# Patient Record
Sex: MALE | Race: BLACK OR AFRICAN AMERICAN | NOT HISPANIC OR LATINO | Employment: FULL TIME | URBAN - METROPOLITAN AREA
[De-identification: names, ages, dates, MRNs, and addresses within clinical notes are randomized per-mention and may not be internally consistent; named-entity substitution may affect disease eponyms.]

---

## 2024-01-09 ENCOUNTER — HOSPITAL ENCOUNTER (EMERGENCY)
Facility: HOSPITAL | Age: 54
Discharge: HOME | End: 2024-01-09
Attending: EMERGENCY MEDICINE
Payer: COMMERCIAL

## 2024-01-09 VITALS
SYSTOLIC BLOOD PRESSURE: 120 MMHG | HEART RATE: 72 BPM | DIASTOLIC BLOOD PRESSURE: 76 MMHG | OXYGEN SATURATION: 93 % | TEMPERATURE: 98.4 F | RESPIRATION RATE: 18 BRPM

## 2024-01-09 DIAGNOSIS — S39.012A LUMBOSACRAL STRAIN, INITIAL ENCOUNTER: ICD-10-CM

## 2024-01-09 DIAGNOSIS — M54.17 LUMBOSACRAL RADICULOPATHY: Primary | ICD-10-CM

## 2024-01-09 PROCEDURE — 2500000004 HC RX 250 GENERAL PHARMACY W/ HCPCS (ALT 636 FOR OP/ED): Performed by: EMERGENCY MEDICINE

## 2024-01-09 PROCEDURE — 96374 THER/PROPH/DIAG INJ IV PUSH: CPT

## 2024-01-09 PROCEDURE — 96375 TX/PRO/DX INJ NEW DRUG ADDON: CPT

## 2024-01-09 PROCEDURE — 99284 EMERGENCY DEPT VISIT MOD MDM: CPT | Performed by: EMERGENCY MEDICINE

## 2024-01-09 RX ORDER — ORPHENADRINE CITRATE 30 MG/ML
60 INJECTION INTRAMUSCULAR; INTRAVENOUS ONCE
Status: COMPLETED | OUTPATIENT
Start: 2024-01-09 | End: 2024-01-09

## 2024-01-09 RX ORDER — KETOROLAC TROMETHAMINE 30 MG/ML
30 INJECTION, SOLUTION INTRAMUSCULAR; INTRAVENOUS ONCE
Status: COMPLETED | OUTPATIENT
Start: 2024-01-09 | End: 2024-01-09

## 2024-01-09 RX ORDER — KETOROLAC TROMETHAMINE 10 MG/1
10 TABLET, FILM COATED ORAL EVERY 6 HOURS PRN
Qty: 20 TABLET | Refills: 0 | Status: SHIPPED | OUTPATIENT
Start: 2024-01-09 | End: 2024-01-14

## 2024-01-09 RX ORDER — CYCLOBENZAPRINE HCL 10 MG
10 TABLET ORAL 3 TIMES DAILY PRN
Qty: 10 TABLET | Refills: 0 | Status: SHIPPED | OUTPATIENT
Start: 2024-01-09

## 2024-01-09 RX ORDER — MORPHINE SULFATE 4 MG/ML
6 INJECTION, SOLUTION INTRAMUSCULAR; INTRAVENOUS ONCE
Status: COMPLETED | OUTPATIENT
Start: 2024-01-09 | End: 2024-01-09

## 2024-01-09 RX ADMIN — ORPHENADRINE CITRATE 60 MG: 60 INJECTION INTRAMUSCULAR; INTRAVENOUS at 22:09

## 2024-01-09 RX ADMIN — KETOROLAC TROMETHAMINE 30 MG: 30 INJECTION, SOLUTION INTRAMUSCULAR; INTRAVENOUS at 22:08

## 2024-01-09 RX ADMIN — MORPHINE SULFATE 6 MG: 4 INJECTION, SOLUTION INTRAMUSCULAR; INTRAVENOUS at 22:09

## 2024-01-09 ASSESSMENT — COLUMBIA-SUICIDE SEVERITY RATING SCALE - C-SSRS
2. HAVE YOU ACTUALLY HAD ANY THOUGHTS OF KILLING YOURSELF?: NO
1. IN THE PAST MONTH, HAVE YOU WISHED YOU WERE DEAD OR WISHED YOU COULD GO TO SLEEP AND NOT WAKE UP?: NO
6. HAVE YOU EVER DONE ANYTHING, STARTED TO DO ANYTHING, OR PREPARED TO DO ANYTHING TO END YOUR LIFE?: NO

## 2024-01-09 ASSESSMENT — PAIN SCALES - GENERAL
PAINLEVEL_OUTOF10: 10 - WORST POSSIBLE PAIN
PAINLEVEL_OUTOF10: 10 - WORST POSSIBLE PAIN

## 2024-01-09 ASSESSMENT — PAIN - FUNCTIONAL ASSESSMENT: PAIN_FUNCTIONAL_ASSESSMENT: 0-10

## 2024-01-09 NOTE — Clinical Note
Fidel Drake was seen and treated in our emergency department on 1/9/2024.  He may return to work on 01/11/2024.       If you have any questions or concerns, please don't hesitate to call.      Dania Koenig MD

## 2024-01-10 NOTE — ED PROVIDER NOTES
HPI   Chief Complaint   Patient presents with    Flank Pain       HPI: []  53-year-old  male today comes in atraumatic back pain.  Patient states that he works as a  he bent over today to  a tire and he sprained his back.  He had sudden atraumatic pain in his left flank area.  He denies trauma or falls but denies paresthesias no weakness.  No incontinence.  No fever or chills.  he does not use IV drugs.  No abdominal pain.  He denies urinary frequency urgency or hematuria.  He denies any back surgeries in the past.  No night sweats.  Denies chest pain pressure heaviness fever chills cough injection incontinence seizures syncope or near syncope.    Past history: None  Social: Patient denies current tobacco alcohol drug abuse.  REVIEW OF SYSTEMS:    GENERAL.: No weight loss, fatigue, anorexia, insomnia, fever.    EYES: No vision loss, double vision, drainage, eye pain.    ENT: No pharyngitis, dry mouth.    CARDIOPULMONARY: No chest pain, palpitations, syncope, near syncope. No shortness of breath, cough, hemoptysis.    GI: No abdominal pain, change in bowel habits, melena, hematemesis, hematochezia, nausea, vomiting, diarrhea.    : No discharge, dysuria, frequency, urgency, hematuria.    MS: No limb pain, joint pain, joint swelling.  Positive for back pain    SKIN: No rashes.    PSYCH: No depression, anxiety, suicidality, homicidality.    Review of systems is otherwise negative unless stated above or in history of present illness.  Social history, family history, allergies reviewed.  PHYSICAL EXAM:    GENERAL: Vitals noted, moderate distress. Alert and oriented  x 3. Non-toxic.      EENT: TMs clear. Posterior oropharynx unremarkable. No meningismus. No LAD.     NECK: Supple. Nontender. No midline tenderness.     CARDIAC: Regular, rate, rhythm. No murmurs rubs or gallops. No JVD    PULMONARY: Lungs clear bilaterally with good aeration. No wheezes rales or rhonchi. No respiratory  distress.     ABDOMEN: Soft, nonsurgical. Nontender. No peritoneal signs. Normoactive bowel sounds. No pulsatile masses.  Negative CVA tenderness negative inguinal hernias    EXTREMITIES: No peripheral edema. Negative Homans bilaterally, no cords.  2+ bounding pulses well-perfused  Musculoskeletal: Patient has no midline C, T, L-spine tenderness.  She is tender palpation in the left paraspinal muscles along the L3-L4-L5 area.  Straight leg positive left lower extremity at 45 degrees, negative right lower extremity.  SKIN: No rash. Intact.     NEURO: No focal neurologic deficits, NIH score of 0. Cranial nerves normal as tested from II through XII.  Motor strength lower extremities 5 out of 5 DTRs equal and well-preserved symmetric sensory exam is normal no signs of cauda equina syndrome and no spinal cord compression.    MEDICAL DECISION MAKING:  Treatment in ED: IV established given intravenous morphine ketorolac and Norflex.    ED course: Repeat assessment will patient feeling much better remains afebrile normotensive no tachycardia hypoxia.  Given the atraumatic injuries pain and no red flags on history examination no midline spine tenderness and the fact he is neurologically intact with no fever and no respecters of infection or fracture or abscess imaging deferred.  And labs deferred.    Impression: #1 acute atraumatic back pain most likely acute lumbosacral strain versus lumbosacral radiculopathy    Plan/MDM: 51-year-old male no medical history comes in after he lifted a heavy tire and developed severe atraumatic left low back pain most likely acute lumbosacral strain versus muscular critical apathy especially fact he has a positive straight leg raise.  Currently neurologic intact bounding pulses no incontinence normal neuro examination motor strength is normal since exam is normal DTRs equal well-preserved low suspicion for cauda equina syndrome and no spinal cord compression.  Image-guided osteomyelitis  epidural abscess pleural hematoma no fevers no drug use history, hence imaging deferred, patient be discharged home with the oral ketorolac and Flexeril advised abortive care physical therapy outpatient but I with strict return precaution.                          Mera Coma Scale Score: 15                  Patient History   No past medical history on file.  No past surgical history on file.  No family history on file.  Social History     Tobacco Use    Smoking status: Not on file    Smokeless tobacco: Not on file   Substance Use Topics    Alcohol use: Not on file    Drug use: Not on file       Physical Exam   ED Triage Vitals [01/09/24 2150]   Temp Heart Rate Resp BP   36.9 °C (98.4 °F) 74 20 96/74      SpO2 Temp src Heart Rate Source Patient Position   95 % -- -- --      BP Location FiO2 (%)     -- --       Physical Exam    ED Course & [facility]   ED Course as of 01/09/24 2331 Tue Jan 09, 2024 2326 On repeat eval patient is feeling much better remains afebrile normotensive no tachycardia or hypoxia.  Patient mostly has acute lumbosacral strain versus lumbosacral radiculopathy.  Given atraumatic nature of his's pain imaging not indicated no risk factors for infection, patient is neurologically intact no signs of cauda equina syndrome or spinal cord compression, bounding pulses low suspicion neurovascular compromise, patient be discharged home with NSAIDs muscle relaxant supportive care outpatient [facility] outpatient follow-up with strict return precaution. [MT]      ED Course User Index  [MT] Dania Koenig MD         Diagnoses as of 01/09/24 2331   Lumbosacral radiculopathy   Lumbosacral strain, initial encounter       Medical Decision Making      Procedure  Procedures     Dania Koenig MD  01/09/24 2335

## 2024-01-10 NOTE — ED TRIAGE NOTES
"Pt BIBA for complaint of Left flank/lower back pain. Pt states he is a , was changing a tire when he felt a sudden extreme pain in his left lower back/flank area. Pt denies any urinary symptoms. Pt states his pain is 10/10 and a \"stabbing\", \"swishing feeling\". Pt denies this ever happening before. Pt A+ox3. Moves all extremities but is weaker on the left lower extremity due to pain.   "